# Patient Record
Sex: MALE | Race: BLACK OR AFRICAN AMERICAN | URBAN - METROPOLITAN AREA
[De-identification: names, ages, dates, MRNs, and addresses within clinical notes are randomized per-mention and may not be internally consistent; named-entity substitution may affect disease eponyms.]

---

## 2021-04-19 ENCOUNTER — HISTORICAL (OUTPATIENT)
Dept: ADMINISTRATIVE | Facility: HOSPITAL | Age: 31
End: 2021-04-19

## 2021-05-13 ENCOUNTER — HISTORICAL (OUTPATIENT)
Dept: RADIOLOGY | Facility: HOSPITAL | Age: 31
End: 2021-05-13

## 2022-04-11 ENCOUNTER — HISTORICAL (OUTPATIENT)
Dept: ADMINISTRATIVE | Facility: HOSPITAL | Age: 32
End: 2022-04-11

## 2022-04-25 VITALS — WEIGHT: 125.44 LBS | HEIGHT: 64 IN | BODY MASS INDEX: 21.42 KG/M2

## 2022-05-05 NOTE — HISTORICAL [facility] CERNER
This is a historical note converted from Luigi. Formatting and pictures may have been removed.  Please reference Luigi for original formatting and attached multimedia. Chief Complaint  Right knee  History of Present Illness  Patient is a?30-year-old male who presents for initial evaluation of his right knee pain after getting kicked by a horse?and days ago. ?He went to the emergency room where he got x-rays?and was told to follow-up with an orthopedic surgeon. ?He is able to bear weight although?he has pain in his right knee. ?The pain is mostly anteromedial. ?He has a effusion of his right knee.? He works with horses for living. ?He denies any numbness or tingling.? He has not been putting?significant weight on?the knee to know if he has?instability  Review of Systems  Constitutional:?no fever, fatigue, weakness  Eye:?no vision loss, eye redness, drainage, or pain  ENMT:?no sore throat, ear pain, sinus pain/congestion, nasal congestion/drainage  Respiratory:?no cough, no wheezing, no shortness of breath  Cardiovascular:?no chest pain, no palpitations, no edema  Gastrointestinal:?no nausea, vomiting, or diarrhea. No abdominal pain  Genitourinary:?no dysuria, no urinary frequency or urgency, no hematuria  Hema/Lymph:?no abnormal bruising or bleeding  Endocrine:?no heat or cold intolerance, no excessive thirst or excessive urination  Musculoskeletal:?no muscle or joint pain, no joint swelling  Integumentary:?no skin rash or abnormal lesion  Neurologic: no headache, no dizziness, no weakness or numbness  ?  Physical Exam  Vitals & Measurements  HT:?162.00?cm? WT:?56.900?kg? BMI:?21.68? LMP:?04/08/2021 00:00 CDT?  NAD  CV: 2+ pulses, wwp  Pulm: NWB  Neuro: Ox4?  Right knee:?Significant effusion. ?Tenderness palpation over the?medial femoral condyle,?medial facet the patella.? No apprehension?with lateral stress of the?patella. ?Range of motion?is 0-130?. ?She lacks about 20 is flexion return to the contralateral side  to large effusion. ?The knee is stable to valgus and varus stress.? There is a firm endpoint to Lachman and posterior drawer.? He is  Intact distally in his foot is warm well-perfused  ?  X-ray of the right knee shows?significant?effusion. ?There is no obvious fracture  Assessment/Plan  1.?Right knee injury?S89.91XA  ?Given his very large effusion Im worried that he may have?damage to other intra-articular structures in his knees.? Im going to order MRI of the right knee without contrast.? We can see him back after the MRI  ?  ?  Billy Lloyd?was evaluated at the time of the encounter with?Dr Coleman.? HPI, PE and treatment plan was reviewed.? Treatment plan was reasonable and necessary.??Imaging was reviewed at the time of visit.??   Medications  Toradol 10 mg oral tablet, 10 mg= 1 tab(s), Oral, q4hr, PRN,? ?Not Taking, Completed Rx  Zofran ODT 8 mg oral tablet, disintegrating, 8 mg, Oral, q6hr, PRN, 1 refills,? ?Not Taking, Completed Rx

## 2025-01-05 ENCOUNTER — HOSPITAL ENCOUNTER (EMERGENCY)
Facility: HOSPITAL | Age: 35
Discharge: HOME | End: 2025-01-05
Attending: EMERGENCY MEDICINE
Payer: COMMERCIAL

## 2025-01-05 VITALS
DIASTOLIC BLOOD PRESSURE: 73 MMHG | TEMPERATURE: 99 F | HEART RATE: 75 BPM | BODY MASS INDEX: 23 KG/M2 | RESPIRATION RATE: 18 BRPM | OXYGEN SATURATION: 99 % | HEIGHT: 62 IN | SYSTOLIC BLOOD PRESSURE: 116 MMHG | WEIGHT: 125 LBS

## 2025-01-05 DIAGNOSIS — S86.912A MUSCLE STRAIN OF LEFT LOWER EXTREMITY, INITIAL ENCOUNTER: ICD-10-CM
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
## 2025-01-05 PROCEDURE — 99284 EMERGENCY DEPT VISIT MOD MDM: CPT

## 2025-01-05 RX ORDER — DICLOFENAC SODIUM 75 MG/1
75 TABLET, DELAYED RELEASE ORAL 2 TIMES DAILY PRN
Qty: 20 TABLET | Refills: 0 | Status: SHIPPED | OUTPATIENT
Start: 2025-01-05

## 2025-01-05 RX ORDER — CYCLOBENZAPRINE HCL 10 MG
10 TABLET ORAL 3 TIMES DAILY PRN
Qty: 15 TABLET | Refills: 0 | Status: SHIPPED | OUTPATIENT
Start: 2025-01-05 | End: 2025-01-10
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
